# Patient Record
Sex: MALE | Race: WHITE | NOT HISPANIC OR LATINO
[De-identification: names, ages, dates, MRNs, and addresses within clinical notes are randomized per-mention and may not be internally consistent; named-entity substitution may affect disease eponyms.]

---

## 2019-08-26 ENCOUNTER — FORM ENCOUNTER (OUTPATIENT)
Age: 64
End: 2019-08-26

## 2019-08-27 ENCOUNTER — APPOINTMENT (OUTPATIENT)
Dept: CT IMAGING | Facility: IMAGING CENTER | Age: 64
End: 2019-08-27

## 2019-08-27 ENCOUNTER — OUTPATIENT (OUTPATIENT)
Dept: OUTPATIENT SERVICES | Facility: HOSPITAL | Age: 64
LOS: 1 days | End: 2019-08-27
Payer: MEDICARE

## 2019-08-27 ENCOUNTER — APPOINTMENT (OUTPATIENT)
Dept: UROLOGY | Facility: CLINIC | Age: 64
End: 2019-08-27
Payer: MEDICARE

## 2019-08-27 VITALS
WEIGHT: 263 LBS | HEIGHT: 65 IN | TEMPERATURE: 98.4 F | SYSTOLIC BLOOD PRESSURE: 151 MMHG | HEART RATE: 16 BPM | BODY MASS INDEX: 43.82 KG/M2 | RESPIRATION RATE: 87 BRPM | DIASTOLIC BLOOD PRESSURE: 83 MMHG

## 2019-08-27 DIAGNOSIS — R19.00 INTRA-ABDOMINAL AND PELVIC SWELLING, MASS AND LUMP, UNSPECIFIED SITE: ICD-10-CM

## 2019-08-27 DIAGNOSIS — Z87.19 PERSONAL HISTORY OF OTHER DISEASES OF THE DIGESTIVE SYSTEM: ICD-10-CM

## 2019-08-27 DIAGNOSIS — Z86.59 PERSONAL HISTORY OF OTHER MENTAL AND BEHAVIORAL DISORDERS: ICD-10-CM

## 2019-08-27 PROBLEM — Z00.00 ENCOUNTER FOR PREVENTIVE HEALTH EXAMINATION: Status: ACTIVE | Noted: 2019-08-27

## 2019-08-27 PROCEDURE — 82565 ASSAY OF CREATININE: CPT

## 2019-08-27 PROCEDURE — 74178 CT ABD&PLV WO CNTR FLWD CNTR: CPT | Mod: 26

## 2019-08-27 PROCEDURE — 99204 OFFICE O/P NEW MOD 45 MIN: CPT

## 2019-08-27 PROCEDURE — 74178 CT ABD&PLV WO CNTR FLWD CNTR: CPT

## 2019-08-27 RX ORDER — CALCIUM CARBONATE/VITAMIN D3 600 MG-10
TABLET ORAL
Refills: 0 | Status: ACTIVE | COMMUNITY

## 2019-08-27 RX ORDER — OMEPRAZOLE 20 MG/1
TABLET, DELAYED RELEASE ORAL
Refills: 0 | Status: ACTIVE | COMMUNITY

## 2019-08-27 RX ORDER — CLONAZEPAM 0.5 MG
0.5 TABLET,DISINTEGRATING ORAL
Refills: 0 | Status: ACTIVE | COMMUNITY

## 2019-08-27 RX ORDER — ATORVASTATIN CALCIUM 10 MG/1
10 TABLET, FILM COATED ORAL
Refills: 0 | Status: ACTIVE | COMMUNITY

## 2019-08-27 RX ORDER — SITAGLIPTIN AND METFORMIN HYDROCHLORIDE 50; 1000 MG/1; MG/1
50-1000 TABLET, FILM COATED, EXTENDED RELEASE ORAL
Refills: 0 | Status: ACTIVE | COMMUNITY

## 2019-08-27 RX ORDER — LOSARTAN POTASSIUM 50 MG/1
50 TABLET, FILM COATED ORAL
Refills: 0 | Status: ACTIVE | COMMUNITY

## 2019-08-27 RX ORDER — POTASSIUM CITRATE 15 MEQ/1
15 MEQ TABLET, EXTENDED RELEASE ORAL
Refills: 0 | Status: ACTIVE | COMMUNITY

## 2019-08-27 NOTE — PHYSICAL EXAM
[General Appearance - Well Nourished] : well nourished [General Appearance - Well Developed] : well developed [] : no respiratory distress [Normal Appearance] : normal appearance [Respiration, Rhythm And Depth] : normal respiratory rhythm and effort [Exaggerated Use Of Accessory Muscles For Inspiration] : no accessory muscle use [Abdomen Soft] : soft [Costovertebral Angle Tenderness] : no ~M costovertebral angle tenderness [No Prostate Nodules] : no prostate nodules [Prostate Size ___ gm] : prostate size [unfilled] gm [FreeTextEntry1] : obese, large ventral hernia

## 2019-08-27 NOTE — HISTORY OF PRESENT ILLNESS
[FreeTextEntry1] : 64M here for evaluation of left zoltan-renal mass. Patient reports extensive history of right nephrolithiasis s/p right PCNL and ureteroscopy at [facility] (Dr. Venegas). Reports right sided flank a few weeks ago and CT done which demonstrated a 4.2 x 3.7 cm solid cellulated marginated mass within the left perirenal mass and a  5.3 x 3.4 cm mass lateral to left psoas and bilateral inguinal lymphadenopathy. Denies gross hematuria, dysuria, fevers/chills \par \par Brother recently passed away from colon cancer\par \par PMH: obesity, HTN, DM, anxiety, PVD  \par PSH: PCNL, ankle surgery, appendectomy, R toe amputation\par Social: non-smoker \par Allergies: septra \par

## 2019-08-27 NOTE — ASSESSMENT
[FreeTextEntry1] : 64M here for evaluation of left zoltan-renal mass. CT done which demonstrated a 4.2 x 3.7 cm solid cellulated marginated mass within the left perirenal mass and a  5.3 x 3.4 cm mass lateral to left psoas\par -CT abdomen/pelvis with contrast today \par -Will most likely require biopsy of both masses

## 2019-08-27 NOTE — REVIEW OF SYSTEMS
[Seen by urologist before (Name)  ___] : Preciously seen by a urologist: [unfilled] [Heartburn] : heartburn [Blood in urine that you can see] : blood visible in urine [Urine Infection (bladder/kidney)] : bladder/kidney infection [History of kidney stones] : history of kidney stones [Told you have blood in urine on a urine test] : told blood was present in a urine test [Skin Wound] : skin wound [Joint Pain] : joint pain [Negative] : Heme/Lymph [FreeTextEntry2] : high blood pressure

## 2019-09-27 ENCOUNTER — APPOINTMENT (OUTPATIENT)
Dept: COLORECTAL SURGERY | Facility: CLINIC | Age: 64
End: 2019-09-27
Payer: MEDICARE

## 2019-09-27 VITALS
SYSTOLIC BLOOD PRESSURE: 131 MMHG | DIASTOLIC BLOOD PRESSURE: 78 MMHG | HEIGHT: 65 IN | WEIGHT: 260 LBS | TEMPERATURE: 98.2 F | BODY MASS INDEX: 43.32 KG/M2 | HEART RATE: 96 BPM

## 2019-09-27 DIAGNOSIS — Z72.3 LACK OF PHYSICAL EXERCISE: ICD-10-CM

## 2019-09-27 DIAGNOSIS — I10 ESSENTIAL (PRIMARY) HYPERTENSION: ICD-10-CM

## 2019-09-27 DIAGNOSIS — Z80.0 FAMILY HISTORY OF MALIGNANT NEOPLASM OF DIGESTIVE ORGANS: ICD-10-CM

## 2019-09-27 DIAGNOSIS — K63.89 OTHER SPECIFIED DISEASES OF INTESTINE: ICD-10-CM

## 2019-09-27 DIAGNOSIS — E78.5 HYPERLIPIDEMIA, UNSPECIFIED: ICD-10-CM

## 2019-09-27 DIAGNOSIS — E11.9 TYPE 2 DIABETES MELLITUS W/OUT COMPLICATIONS: ICD-10-CM

## 2019-09-27 PROCEDURE — 99204 OFFICE O/P NEW MOD 45 MIN: CPT

## 2019-10-04 PROBLEM — K63.89 MESENTERIC MASS: Status: ACTIVE | Noted: 2019-10-04

## 2019-10-04 NOTE — HISTORY OF PRESENT ILLNESS
[FreeTextEntry1] : 63 y/o M presents for evaluation of abdominal mass\par H/o kidney stones, several weeks again began to have flank pain. Evaluated by Dr. Venegas at [place], in office U/s completed that showed renal calculi. Recommended to have CT scan for further evaluation, mass noted on CT, referred to urology \par CT on 8/21/19\par - abnormal left retroperitoneal soft tissue mass\par \par Evaluated by Dr. Chin and CT with contrast ordered 8/27/19\par - nonobstructing subcentimeter right renal calculi\par - enhancing mass in the left renal fossa measuring 3.8 x 3.3 cm\par - Similar mass in the pelvis adjacent to the left psoas muscle measures 3.4 x 1.7 cm \par \par Prostate on PE normal\par Evaluated by Dr. Mehta at [facility]. Biopsy completed that did not show malignancy\par \par PET CT completed 9/19/19 at [facility]\par - hypermetabolic left mesenteric masses with hypermetabolic lymph nodes below the diaphragm concerning for malignant involvement\par - foci in the left femoral neck and left proximal femoral shaft without low-dose CT\par - diffuse intense uptake in the bowel could be physiologic vs. inflammatory vs. enhanced \par \par Denies abdominal, flank, rectal pain, rectal bleeding\par Denies fever or chills\par Recently treated with a cellulitis of the left leg. Originally treated with Augmentin by PCP with minimal improvement in symptoms. Dr. Mehta considered hospitalizing patient for IV antibiotics but decided to switch regiment to  Doxycycline and Levaquin with complete resolution of symptoms \par Evaluated by Dr. Wagner (GI) [facility] after being treated for GERD last year, \par Has been evaluated at [facility], [place], [facility], and [facility] over the course of the past several years for excessive gas. Has had an NG tube placed several times for decompression. During last hospital stay was started on Klonopin to help reduce anxiety and excessive intake of air \par Denies change in appetite, weight or energy level\par BH: Daily\par Denies constipation, diarrhea or straining\par Reports adequate dietary fiber and water intake\par FMH of colon cancer in his brother, diagnosed in his late 40's\par Never had a colonoscopy. Has had an EGD\par Taking ASA 81 mg daily for heart health

## 2019-10-04 NOTE — CONSULT LETTER
[Dear  ___] : Dear  [unfilled], [Consult Letter:] : I had the pleasure of evaluating your patient, [unfilled]. [Please see my note below.] : Please see my note below. [( Thank you for referring [unfilled] for consultation for _____ )] : Thank you for referring [unfilled] for consultation for [unfilled] [Sincerely,] : Sincerely, [Consult Closing:] : Thank you very much for allowing me to participate in the care of this patient.  If you have any questions, please do not hesitate to contact me. [FreeTextEntry2] : MENDEL GOLDFINGER\par [address]\[place] [address] [FreeTextEntry3] : NEEL FATIMA MD

## 2019-10-04 NOTE — PHYSICAL EXAM
[Abdomen Masses] : No abdominal masses [Abdomen Tenderness] : ~T No ~M abdominal tenderness [JVD] : no jugular venous distention  [No HSM] : no hepatosplenomegaly [No Rash or Lesion] : No rash or lesion [Normal Breath Sounds] : Normal breath sounds [de-identified] : bilateral lower extremity edema [Alert] : alert

## 2019-10-27 ENCOUNTER — FORM ENCOUNTER (OUTPATIENT)
Age: 64
End: 2019-10-27

## 2019-10-28 ENCOUNTER — OUTPATIENT (OUTPATIENT)
Dept: OUTPATIENT SERVICES | Facility: HOSPITAL | Age: 64
LOS: 1 days | End: 2019-10-28
Payer: MEDICARE

## 2019-10-28 ENCOUNTER — APPOINTMENT (OUTPATIENT)
Dept: INTERVENTIONAL RADIOLOGY/VASCULAR | Facility: HOSPITAL | Age: 64
End: 2019-10-28
Payer: MEDICARE

## 2019-10-28 ENCOUNTER — RESULT REVIEW (OUTPATIENT)
Age: 64
End: 2019-10-28

## 2019-10-28 ENCOUNTER — APPOINTMENT (OUTPATIENT)
Dept: CT IMAGING | Facility: HOSPITAL | Age: 64
End: 2019-10-28
Payer: MEDICARE

## 2019-10-28 PROCEDURE — 77012 CT SCAN FOR NEEDLE BIOPSY: CPT

## 2019-10-28 PROCEDURE — 88305 TISSUE EXAM BY PATHOLOGIST: CPT

## 2019-10-28 PROCEDURE — 49180 BIOPSY ABDOMINAL MASS: CPT

## 2019-10-28 PROCEDURE — 88307 TISSUE EXAM BY PATHOLOGIST: CPT

## 2019-10-28 PROCEDURE — 77012 CT SCAN FOR NEEDLE BIOPSY: CPT | Mod: 26

## 2019-10-28 PROCEDURE — 88342 IMHCHEM/IMCYTCHM 1ST ANTB: CPT

## 2019-10-28 PROCEDURE — 88173 CYTOPATH EVAL FNA REPORT: CPT

## 2019-10-28 PROCEDURE — 88341 IMHCHEM/IMCYTCHM EA ADD ANTB: CPT

## 2019-10-28 PROCEDURE — C1769: CPT

## 2019-10-28 PROCEDURE — 88360 TUMOR IMMUNOHISTOCHEM/MANUAL: CPT

## 2019-11-01 LAB
NON-GYNECOLOGICAL CYTOLOGY STUDY: SIGNIFICANT CHANGE UP
SURGICAL PATHOLOGY STUDY: SIGNIFICANT CHANGE UP

## 2021-10-06 PROBLEM — I10 ESSENTIAL HYPERTENSION: Status: ACTIVE | Noted: 2019-08-27
